# Patient Record
Sex: FEMALE | Race: WHITE | NOT HISPANIC OR LATINO | Employment: UNEMPLOYED | URBAN - METROPOLITAN AREA
[De-identification: names, ages, dates, MRNs, and addresses within clinical notes are randomized per-mention and may not be internally consistent; named-entity substitution may affect disease eponyms.]

---

## 2018-09-13 ENCOUNTER — ANNUAL EXAM (OUTPATIENT)
Dept: OBGYN CLINIC | Facility: CLINIC | Age: 48
End: 2018-09-13
Payer: COMMERCIAL

## 2018-09-13 VITALS — DIASTOLIC BLOOD PRESSURE: 80 MMHG | HEIGHT: 60 IN | SYSTOLIC BLOOD PRESSURE: 112 MMHG

## 2018-09-13 DIAGNOSIS — Z12.39 BREAST CANCER SCREENING: ICD-10-CM

## 2018-09-13 DIAGNOSIS — Z01.419 ENCOUNTER FOR ANNUAL ROUTINE GYNECOLOGICAL EXAMINATION: Primary | ICD-10-CM

## 2018-09-13 PROCEDURE — G0145 SCR C/V CYTO,THINLAYER,RESCR: HCPCS | Performed by: OBSTETRICS & GYNECOLOGY

## 2018-09-13 PROCEDURE — 99386 PREV VISIT NEW AGE 40-64: CPT | Performed by: OBSTETRICS & GYNECOLOGY

## 2018-09-13 PROCEDURE — 87624 HPV HI-RISK TYP POOLED RSLT: CPT | Performed by: OBSTETRICS & GYNECOLOGY

## 2018-09-13 NOTE — PROGRESS NOTES
Assessment/Plan:    Pap smear done as well as annual   Encouraged self-breast examination as well as calcium supplementation  Recommend baseline mammogram   Offered screening lab including cholesterol, thyroid, diabetes  She would like to discuss this with her primary care physician  Reviewed tere menopausal symptoms  Return to office in 1 year  No problem-specific Assessment & Plan notes found for this encounter  Diagnoses and all orders for this visit:    Encounter for annual routine gynecological examination  -     Liquid-based pap, screening    Breast cancer screening  -     Mammo screening bilateral w 3d & cad; Future          Subjective:      Patient ID: Ariana Fritz is a 51 y o  female  HPI     This is a 50-year-old female  ( x4, aged 20, 22, 15, 12) presents as a new patient for annual well gyn exam   Her last gyn exam was approximately 15 years ago  Her menstrual cycles are regular every 4 weeks lasting 3 days with no breakthrough bleeding  She denies any changes in bowel or bladder function  She has been in a monogamous relationship for over 23 years with her   Her last Pap smear was 15 years ago  She states all her Paps have been normal  She has never had a baseline mammogram   She has not seen a physician since her last delivery  She states her pregnancies were low risk and tested negative for gestational diabetes  The following portions of the patient's history were reviewed and updated as appropriate: allergies, current medications, past family history, past medical history, past social history, past surgical history and problem list     Review of Systems   Constitutional: Negative for fatigue, fever and unexpected weight change  Respiratory: Negative for cough, chest tightness, shortness of breath and wheezing  Cardiovascular: Negative  Negative for chest pain and palpitations  Gastrointestinal: Negative    Negative for abdominal distention, abdominal pain, blood in stool, constipation, diarrhea, nausea and vomiting  Genitourinary: Negative  Negative for difficulty urinating, dyspareunia, dysuria, flank pain, frequency, genital sores, hematuria, pelvic pain, urgency, vaginal bleeding, vaginal discharge and vaginal pain  Skin: Negative for rash  Objective:      /80   Ht 5' (1 524 m)   LMP 08/07/2018 (Approximate)   Breastfeeding? No          Physical Exam   Constitutional: She appears well-developed and well-nourished  Cardiovascular: Normal rate and regular rhythm  Pulmonary/Chest: Effort normal and breath sounds normal  Right breast exhibits no inverted nipple, no mass, no nipple discharge, no skin change and no tenderness  Left breast exhibits no inverted nipple, no mass, no nipple discharge, no skin change and no tenderness  Abdominal: Soft  Bowel sounds are normal  She exhibits no distension  There is no tenderness  There is no rebound and no guarding  Genitourinary: Vagina normal and uterus normal  There is no lesion on the right labia  There is no lesion on the left labia  Cervix exhibits no discharge and no friability  Right adnexum displays no mass, no tenderness and no fullness  Left adnexum displays no mass, no tenderness and no fullness  No vaginal discharge found

## 2018-09-14 LAB
HPV HR 12 DNA CVX QL NAA+PROBE: NEGATIVE
HPV16 DNA CVX QL NAA+PROBE: NEGATIVE
HPV18 DNA CVX QL NAA+PROBE: NEGATIVE

## 2018-09-18 LAB
LAB AP GYN PRIMARY INTERPRETATION: NORMAL
LAB AP LMP: NORMAL
Lab: NORMAL

## 2018-09-19 ENCOUNTER — HOSPITAL ENCOUNTER (OUTPATIENT)
Dept: MAMMOGRAPHY | Facility: HOSPITAL | Age: 48
Discharge: HOME | End: 2018-09-19
Payer: COMMERCIAL

## 2018-09-19 DIAGNOSIS — Z12.39 BREAST CANCER SCREENING: ICD-10-CM

## 2018-09-19 PROCEDURE — 77063 BREAST TOMOSYNTHESIS BI: CPT

## 2018-09-19 PROCEDURE — 77067 SCR MAMMO BI INCL CAD: CPT

## 2018-09-21 ENCOUNTER — TELEPHONE (OUTPATIENT)
Dept: OBGYN CLINIC | Facility: CLINIC | Age: 48
End: 2018-09-21

## 2018-09-21 NOTE — TELEPHONE ENCOUNTER
Pt informed  mgram results 9/19/18 - (L) breast asymmetric density - rec diag (L) breast mgram - rad order already in Epic  Pt will schedule appt time

## 2018-09-21 NOTE — TELEPHONE ENCOUNTER
----- Message from Royce Reilly DO sent at 9/19/2018  3:35 PM EDT -----  Inform patient needs additional views

## 2018-10-02 ENCOUNTER — HOSPITAL ENCOUNTER (OUTPATIENT)
Dept: MAMMOGRAPHY | Facility: CLINIC | Age: 48
Discharge: HOME | End: 2018-10-02
Payer: COMMERCIAL

## 2018-10-02 ENCOUNTER — HOSPITAL ENCOUNTER (OUTPATIENT)
Dept: ULTRASOUND IMAGING | Facility: CLINIC | Age: 48
Discharge: HOME | End: 2018-10-02
Payer: COMMERCIAL

## 2018-10-02 DIAGNOSIS — R92.8 ABNORMAL MAMMOGRAM: ICD-10-CM

## 2018-10-02 PROCEDURE — 76642 ULTRASOUND BREAST LIMITED: CPT

## 2018-10-02 PROCEDURE — 77065 DX MAMMO INCL CAD UNI: CPT

## 2018-10-02 PROCEDURE — G0279 TOMOSYNTHESIS, MAMMO: HCPCS

## 2018-10-03 ENCOUNTER — TRANSCRIBE ORDERS (OUTPATIENT)
Dept: ADMINISTRATIVE | Facility: HOSPITAL | Age: 48
End: 2018-10-03

## 2018-10-03 DIAGNOSIS — R92.8 ABNORMAL MAMMOGRAM: Primary | ICD-10-CM

## 2019-04-03 ENCOUNTER — HOSPITAL ENCOUNTER (OUTPATIENT)
Dept: ULTRASOUND IMAGING | Facility: CLINIC | Age: 49
Discharge: HOME | End: 2019-04-03
Payer: COMMERCIAL

## 2019-04-03 ENCOUNTER — HOSPITAL ENCOUNTER (OUTPATIENT)
Dept: MAMMOGRAPHY | Facility: CLINIC | Age: 49
Discharge: HOME | End: 2019-04-03
Payer: COMMERCIAL

## 2019-04-03 VITALS — HEIGHT: 61 IN

## 2019-04-03 DIAGNOSIS — R92.8 ABNORMAL MAMMOGRAM: ICD-10-CM

## 2019-04-03 DIAGNOSIS — R92.8 ABNORMAL MAMMOGRAM: Primary | ICD-10-CM

## 2019-04-03 PROCEDURE — 76642 ULTRASOUND BREAST LIMITED: CPT

## 2019-04-03 PROCEDURE — G0279 TOMOSYNTHESIS, MAMMO: HCPCS

## 2019-04-03 PROCEDURE — 77065 DX MAMMO INCL CAD UNI: CPT

## 2019-04-10 ENCOUNTER — TELEPHONE (OUTPATIENT)
Dept: OBGYN CLINIC | Facility: CLINIC | Age: 49
End: 2019-04-10

## 2019-04-10 DIAGNOSIS — Z12.39 SCREENING FOR BREAST CANCER: Primary | ICD-10-CM

## 2019-12-02 ENCOUNTER — TELEPHONE (OUTPATIENT)
Dept: BEHAVIORAL/MENTAL HEALTH CLINIC | Facility: CLINIC | Age: 49
End: 2019-12-02

## 2020-12-06 ENCOUNTER — AMB VIDEO VISIT (OUTPATIENT)
Dept: OTHER | Facility: HOSPITAL | Age: 50
End: 2020-12-06
Payer: COMMERCIAL

## 2020-12-06 PROCEDURE — 99201 PR OFFICE OUTPATIENT NEW 10 MINUTES: CPT | Performed by: FAMILY MEDICINE

## 2021-02-27 ENCOUNTER — IMMUNIZATIONS (OUTPATIENT)
Dept: FAMILY MEDICINE CLINIC | Facility: HOSPITAL | Age: 51
End: 2021-02-27

## 2021-02-27 DIAGNOSIS — Z23 ENCOUNTER FOR IMMUNIZATION: Primary | ICD-10-CM

## 2021-02-27 PROCEDURE — 0001A SARS-COV-2 / COVID-19 MRNA VACCINE (PFIZER-BIONTECH) 30 MCG: CPT

## 2021-02-27 PROCEDURE — 91300 SARS-COV-2 / COVID-19 MRNA VACCINE (PFIZER-BIONTECH) 30 MCG: CPT

## 2021-03-20 ENCOUNTER — IMMUNIZATIONS (OUTPATIENT)
Dept: FAMILY MEDICINE CLINIC | Facility: HOSPITAL | Age: 51
End: 2021-03-20

## 2021-03-20 DIAGNOSIS — Z23 ENCOUNTER FOR IMMUNIZATION: Primary | ICD-10-CM

## 2021-03-20 PROCEDURE — 0002A SARS-COV-2 / COVID-19 MRNA VACCINE (PFIZER-BIONTECH) 30 MCG: CPT

## 2021-03-20 PROCEDURE — 91300 SARS-COV-2 / COVID-19 MRNA VACCINE (PFIZER-BIONTECH) 30 MCG: CPT

## 2021-12-06 ENCOUNTER — IMMUNIZATIONS (OUTPATIENT)
Dept: FAMILY MEDICINE CLINIC | Facility: HOSPITAL | Age: 51
End: 2021-12-06

## 2021-12-06 DIAGNOSIS — Z23 ENCOUNTER FOR IMMUNIZATION: Primary | ICD-10-CM

## 2021-12-06 PROCEDURE — 91300 COVID-19 PFIZER VACC 0.3 ML: CPT

## 2021-12-06 PROCEDURE — 0001A COVID-19 PFIZER VACC 0.3 ML: CPT

## 2022-06-01 ENCOUNTER — TRANSCRIBE ORDERS (OUTPATIENT)
Dept: PAIN MEDICINE | Facility: CLINIC | Age: 52
End: 2022-06-01

## 2022-06-01 ENCOUNTER — OFFICE VISIT (OUTPATIENT)
Dept: FAMILY MEDICINE CLINIC | Facility: CLINIC | Age: 52
End: 2022-06-01
Payer: COMMERCIAL

## 2022-06-01 VITALS
HEIGHT: 62 IN | OXYGEN SATURATION: 98 % | HEART RATE: 81 BPM | TEMPERATURE: 97.5 F | SYSTOLIC BLOOD PRESSURE: 124 MMHG | RESPIRATION RATE: 18 BRPM | DIASTOLIC BLOOD PRESSURE: 82 MMHG

## 2022-06-01 DIAGNOSIS — Z11.59 NEED FOR HEPATITIS C SCREENING TEST: ICD-10-CM

## 2022-06-01 DIAGNOSIS — Z00.00 ANNUAL PHYSICAL EXAM: Primary | ICD-10-CM

## 2022-06-01 DIAGNOSIS — Z23 ENCOUNTER FOR VACCINATION: ICD-10-CM

## 2022-06-01 DIAGNOSIS — Z12.11 SCREEN FOR COLON CANCER: ICD-10-CM

## 2022-06-01 DIAGNOSIS — E66.3 OVERWEIGHT: ICD-10-CM

## 2022-06-01 DIAGNOSIS — Z12.31 ENCOUNTER FOR SCREENING MAMMOGRAM FOR BREAST CANCER: ICD-10-CM

## 2022-06-01 DIAGNOSIS — Z11.4 SCREENING FOR HIV (HUMAN IMMUNODEFICIENCY VIRUS): ICD-10-CM

## 2022-06-01 PROCEDURE — 90750 HZV VACC RECOMBINANT IM: CPT

## 2022-06-01 PROCEDURE — 99386 PREV VISIT NEW AGE 40-64: CPT | Performed by: FAMILY MEDICINE

## 2022-06-01 PROCEDURE — 90471 IMMUNIZATION ADMIN: CPT

## 2022-06-01 NOTE — PROGRESS NOTES
Assessment/Plan:       Problem List Items Addressed This Visit    None     Visit Diagnoses     Annual physical exam    -  Primary- screening labs as noted  She is doing well w/ diet/exercise  Refer for colonoscopy  Mammo ordered  F/u in one year or prn    Relevant Orders    Comprehensive metabolic panel    Lipid Panel with Direct LDL reflex    Hemoglobin A1C    Need for hepatitis C screening test        Relevant Orders    Hepatitis C Antibody ([facility])    Screening for HIV (human immunodeficiency virus)        Relevant Orders    HIV 1/2 Antigen/Antibody (4th Generation) w Reflex [facility]    Encounter for screening mammogram for breast cancer        Relevant Orders    Mammo screening bilateral w 3d & cad    Screen for colon cancer        Relevant Orders    Ambulatory referral for Colonoscopy    Overweight        Relevant Orders    TSH, 3rd generation with Free T4 reflex    Encounter for vaccination    - shingrix today, return in 2-6 months for repeat dose  She will think about Tdap    Relevant Orders    Zoster Vaccine Recombinant IM (Completed)               Subjective: Ethel Morales is a 45 y o  female here today with chief complaint below:  Chief Complaint   Patient presents with    Physical Exam    OTHER     HM- Declined Tdap ok for hepc, HIV CRC and Mammo      - CC above per clinical staff and reviewed  HPI:  Pt here for first visit in our office to establish care/annual PE  Hasn't had routine care in a while aside from gyn  She has four kids ages 22-23- all in good health   is an orthopedic surgeon in the network  Family hx of colon cancer in grandfather- age 76s  Normal BMs, no blood in stool  Does have occ heartburn, usually triggered by fatty foods or wine  She exercises regularly, works with a   Diet is overall healthy    She has stayed about the same weight and size since she was a teenager, but frustrated that she can't seem to lose weight despite healthy lifestyle habits  Sleep is overall good  Mood good  The following portions of the patient's history were reviewed and updated as appropriate: allergies, current medications, past family history, past medical history, past social history, past surgical history and problem list     ROS:  Review of Systems     Objective:      /82   Pulse 81   Temp 97 5 °F (36 4 °C)   Resp 18   Ht 5' 1 5" (1 562 m)   SpO2 98%   BP Readings from Last 3 Encounters:   06/01/22 124/82   09/13/18 112/80     Wt Readings from Last 3 Encounters:   No data found for Wt               Physical Exam:   Physical Exam  Vitals and nursing note reviewed  Constitutional:       General: She is not in acute distress  Appearance: Normal appearance  She is well-developed  She is not ill-appearing  HENT:      Head: Normocephalic and atraumatic  Eyes:      Conjunctiva/sclera: Conjunctivae normal    Neck:      Vascular: No carotid bruit  Cardiovascular:      Rate and Rhythm: Normal rate and regular rhythm  Heart sounds: Normal heart sounds  No murmur heard  Pulmonary:      Effort: Pulmonary effort is normal  No respiratory distress  Breath sounds: Normal breath sounds  No wheezing  Abdominal:      Palpations: Abdomen is soft  Tenderness: There is no abdominal tenderness  There is no guarding or rebound  Musculoskeletal:      Cervical back: Neck supple  Right lower leg: No edema  Left lower leg: No edema  Lymphadenopathy:      Cervical: No cervical adenopathy  Skin:     General: Skin is warm and dry  Neurological:      Mental Status: She is alert and oriented to person, place, and time  Psychiatric:         Mood and Affect: Mood normal          Behavior: Behavior normal          BMI Counseling: BMI was not able to be calculated due to patient refusing height and/or weight

## 2022-08-04 ENCOUNTER — CLINICAL SUPPORT (OUTPATIENT)
Dept: FAMILY MEDICINE CLINIC | Facility: CLINIC | Age: 52
End: 2022-08-04
Payer: COMMERCIAL

## 2022-08-04 DIAGNOSIS — Z23 ENCOUNTER FOR ADMINISTRATION OF VACCINE: Primary | ICD-10-CM

## 2022-08-04 PROCEDURE — 90471 IMMUNIZATION ADMIN: CPT

## 2022-08-04 PROCEDURE — 90750 HZV VACC RECOMBINANT IM: CPT

## 2022-09-08 ENCOUNTER — HOSPITAL ENCOUNTER (OUTPATIENT)
Dept: MAMMOGRAPHY | Facility: HOSPITAL | Age: 52
Discharge: HOME | End: 2022-09-08
Payer: COMMERCIAL

## 2022-09-08 VITALS — HEIGHT: 62 IN

## 2022-09-08 DIAGNOSIS — Z12.31 ENCOUNTER FOR SCREENING MAMMOGRAM FOR BREAST CANCER: ICD-10-CM

## 2022-09-08 PROCEDURE — 77063 BREAST TOMOSYNTHESIS BI: CPT

## 2022-09-08 PROCEDURE — 77067 SCR MAMMO BI INCL CAD: CPT

## 2022-09-22 ENCOUNTER — PREP FOR PROCEDURE (OUTPATIENT)
Dept: GASTROENTEROLOGY | Facility: CLINIC | Age: 52
End: 2022-09-22

## 2022-09-22 DIAGNOSIS — Z12.11 SPECIAL SCREENING FOR MALIGNANT NEOPLASMS, COLON: Primary | ICD-10-CM

## 2022-09-22 NOTE — TELEPHONE ENCOUNTER
Scheduled date of colonoscopy (as of today): 11/10/22  Physician performing colonoscopy: Dr Selvin Toure   Location of colonoscopy: [facility]  Bowel prep reviewed with patient: Gypsyannie   Instructions reviewed with patient by: Amparo Quigley mailed prep instructions   Clearances: N/A     09/14/22  Screened by: Michaelle Garcia    Referring Provider Dr Aiden Garrido: There is no height or weight on file to calculate BMI  Has patient been referred for a routine screening Colonoscopy? yes  Is the patient between 39-70 years old? yes      Previous Colonoscopy no   If yes:    Date:     Facility:     Reason:       SCHEDULING STAFF: If the patient is between 45yrs-49yrs, please advise patient to confirm benefits/coverage with their insurance company for a routine screening colonoscopy, some insurance carriers will only cover at [facility] or older  If the patient is over 66years old, please schedule an office visit  Does the patient want to see a Gastroenterologist prior to their procedure OR are they having any GI symptoms? no    Has the patient been hospitalized or had abdominal surgery in the past 6 months? no    Does the patient use supplemental oxygen? no    Does the patient take Coumadin, Lovenox, Plavix, Elliquis, Xarelto, or other blood thinning medication? no    Has the patient had a stroke, cardiac event, or stent placed in the past year? no    SCHEDULING STAFF: If patient answers NO to above questions, then schedule procedure  If patient answers YES to above questions, then schedule office appointment  Pt passed OA and can be reached at [phone]    If patient is between 45yrs - 49yrs, please advise patient that we will have to confirm benefits & coverage with their insurance company for a routine screening colonoscopy

## 2022-11-10 ENCOUNTER — ANESTHESIA EVENT (OUTPATIENT)
Dept: GASTROENTEROLOGY | Facility: AMBULARY SURGERY CENTER | Age: 52
End: 2022-11-10

## 2022-11-10 ENCOUNTER — ANESTHESIA (OUTPATIENT)
Dept: GASTROENTEROLOGY | Facility: AMBULARY SURGERY CENTER | Age: 52
End: 2022-11-10

## 2022-11-10 ENCOUNTER — HOSPITAL ENCOUNTER (OUTPATIENT)
Dept: GASTROENTEROLOGY | Facility: AMBULARY SURGERY CENTER | Age: 52
Setting detail: OUTPATIENT SURGERY
End: 2022-11-10
Attending: INTERNAL MEDICINE

## 2022-11-10 VITALS
HEART RATE: 56 BPM | OXYGEN SATURATION: 98 % | SYSTOLIC BLOOD PRESSURE: 136 MMHG | RESPIRATION RATE: 20 BRPM | WEIGHT: 210 LBS | BODY MASS INDEX: 39.65 KG/M2 | DIASTOLIC BLOOD PRESSURE: 63 MMHG | TEMPERATURE: 97.5 F | HEIGHT: 61 IN

## 2022-11-10 DIAGNOSIS — Z12.11 SPECIAL SCREENING FOR MALIGNANT NEOPLASMS, COLON: ICD-10-CM

## 2022-11-10 RX ORDER — LIDOCAINE HYDROCHLORIDE 10 MG/ML
INJECTION, SOLUTION EPIDURAL; INFILTRATION; INTRACAUDAL; PERINEURAL AS NEEDED
Status: DISCONTINUED | OUTPATIENT
Start: 2022-11-10 | End: 2022-11-10

## 2022-11-10 RX ORDER — METOCLOPRAMIDE HYDROCHLORIDE 5 MG/ML
INJECTION INTRAMUSCULAR; INTRAVENOUS AS NEEDED
Status: DISCONTINUED | OUTPATIENT
Start: 2022-11-10 | End: 2022-11-10

## 2022-11-10 RX ORDER — ONDANSETRON 2 MG/ML
INJECTION INTRAMUSCULAR; INTRAVENOUS AS NEEDED
Status: DISCONTINUED | OUTPATIENT
Start: 2022-11-10 | End: 2022-11-10

## 2022-11-10 RX ORDER — PROPOFOL 10 MG/ML
INJECTION, EMULSION INTRAVENOUS AS NEEDED
Status: DISCONTINUED | OUTPATIENT
Start: 2022-11-10 | End: 2022-11-10

## 2022-11-10 RX ORDER — SODIUM CHLORIDE, SODIUM LACTATE, POTASSIUM CHLORIDE, CALCIUM CHLORIDE 600; 310; 30; 20 MG/100ML; MG/100ML; MG/100ML; MG/100ML
INJECTION, SOLUTION INTRAVENOUS CONTINUOUS PRN
Status: DISCONTINUED | OUTPATIENT
Start: 2022-11-10 | End: 2022-11-10

## 2022-11-10 RX ADMIN — METOCLOPRAMIDE 10 MG: 5 INJECTION, SOLUTION INTRAMUSCULAR; INTRAVENOUS at 08:08

## 2022-11-10 RX ADMIN — PROPOFOL 50 MG: 10 INJECTION, EMULSION INTRAVENOUS at 07:46

## 2022-11-10 RX ADMIN — ONDANSETRON 4 MG: 2 INJECTION INTRAMUSCULAR; INTRAVENOUS at 08:08

## 2022-11-10 RX ADMIN — PROPOFOL 50 MG: 10 INJECTION, EMULSION INTRAVENOUS at 07:49

## 2022-11-10 RX ADMIN — Medication 40 MG: at 07:43

## 2022-11-10 RX ADMIN — LIDOCAINE HYDROCHLORIDE 50 MG: 10 INJECTION, SOLUTION EPIDURAL; INFILTRATION; INTRACAUDAL at 07:36

## 2022-11-10 RX ADMIN — PROPOFOL 80 MG: 10 INJECTION, EMULSION INTRAVENOUS at 07:36

## 2022-11-10 RX ADMIN — PROPOFOL 50 MG: 10 INJECTION, EMULSION INTRAVENOUS at 07:39

## 2022-11-10 RX ADMIN — PROPOFOL 50 MG: 10 INJECTION, EMULSION INTRAVENOUS at 07:52

## 2022-11-10 RX ADMIN — PROPOFOL 50 MG: 10 INJECTION, EMULSION INTRAVENOUS at 07:54

## 2022-11-10 RX ADMIN — PROPOFOL 20 MG: 10 INJECTION, EMULSION INTRAVENOUS at 07:37

## 2022-11-10 RX ADMIN — PROPOFOL 50 MG: 10 INJECTION, EMULSION INTRAVENOUS at 07:41

## 2022-11-10 RX ADMIN — PROPOFOL 50 MG: 10 INJECTION, EMULSION INTRAVENOUS at 07:44

## 2022-11-10 RX ADMIN — SODIUM CHLORIDE, SODIUM LACTATE, POTASSIUM CHLORIDE, AND CALCIUM CHLORIDE: .6; .31; .03; .02 INJECTION, SOLUTION INTRAVENOUS at 07:20

## 2022-11-10 RX ADMIN — PROPOFOL 50 MG: 10 INJECTION, EMULSION INTRAVENOUS at 07:42

## 2022-11-10 NOTE — PERIOPERATIVE NURSING NOTE
Patient awoke with 7/10 pain in the RUQ  She stated, "I want to take a deep breath but I can't"  Notified anesthesia Dr Veronica Reilly and Dr Jolie Manuel  Assisted and instructed patient to reposition in the bed and amb in the liao  Patient reported less pain (4/10)after returning to stretcher lying supine  VSS  Spouse at bedside

## 2022-11-10 NOTE — ANESTHESIA PREPROCEDURE EVALUATION
Procedure:  COLONOSCOPY    Relevant Problems   No relevant active problems        Physical Exam    Airway    Mallampati score: I  TM Distance: >3 FB  Neck ROM: full     Dental       Cardiovascular      Pulmonary      Other Findings        Anesthesia Plan  ASA Score- 2     Anesthesia Type- IV sedation with anesthesia with ASA Monitors  Additional Monitors:   Airway Plan:           Plan Factors-Exercise tolerance (METS): >4 METS  Chart reviewed  Existing labs reviewed  Patient summary reviewed  Induction- intravenous  Postoperative Plan-     Informed Consent- Anesthetic plan and risks discussed with patient  I personally reviewed this patient with the CRNA  Discussed and agreed on the Anesthesia Plan with the CRNA  Valente Diego

## 2022-11-10 NOTE — H&P
History and Physical -  [facility]  Johnson Mendez 47 y o  female MRN: [id number]                  HPI: Johnson Mendez is a 47y o  year old female who presents for colonoscopy  REVIEW OF SYSTEMS: Per the HPI, and otherwise unremarkable  Historical Information   History reviewed  No pertinent past medical history  Past Surgical History:   Procedure Laterality Date   • NO PAST SURGERIES       Social History   Social History     Substance and Sexual Activity   Alcohol Use Yes    Comment: Ocassionally     Social History     Substance and Sexual Activity   Drug Use No     Social History     Tobacco Use   Smoking Status Never Smoker   Smokeless Tobacco Never Used     Family History   Problem Relation Age of Onset   • Alzheimer's disease Mother         dx age 46,  age 58  youngest of six sibs- no others with dementia   • Hypertension Mother    • Colon polyps Father    • Prostate cancer Father 68   • No Known Problems Sister    • No Known Problems Daughter    • Colon cancer Paternal Grandmother 76         age 76 of colon ca   • Other Brother         unknown cause   • No Known Problems Son    • No Known Problems Son    • No Known Problems Son    • Breast cancer Maternal Aunt 68       Meds/Allergies     No current outpatient medications on file  No current facility-administered medications for this encounter      Facility-Administered Medications Ordered in Other Encounters:   •  lactated ringers infusion, , Intravenous, Continuous PRN, New Bag at 11/10/22 0720    Allergies   Allergen Reactions   • Augmentin [Amoxicillin-Pot Clavulanate] Hives       Objective     /63   Pulse 67   Temp 97 7 °F (36 5 °C) (Temporal)   Resp 18   Ht 5' 1" (1 549 m)   Wt 95 3 kg (210 lb)   LMP 2019 (Exact Date)   SpO2 100%   BMI 39 68 kg/m²       PHYSICAL EXAM    Gen: NAD  Head: NCAT  CV: RRR  CHEST: Clear  ABD: soft, NT/ND  EXT: no edema      ASSESSMENT/PLAN:  This is a 47y o  year old female here for colonoscopy, and she is stable and optimized for her procedure

## 2022-11-10 NOTE — ANESTHESIA POSTPROCEDURE EVALUATION
Post-Op Assessment Note    CV Status:  Stable  Pain Score: 0    Pain management: adequate     Mental Status:  Alert and awake   Hydration Status:  Euvolemic   PONV Controlled:  Controlled   Airway Patency:  Patent      Post Op Vitals Reviewed: Yes      Staff: CRNA         No complications documented      BP   128/64   Temp      Pulse  73   Resp   17   SpO2   98

## 2023-02-16 ENCOUNTER — APPOINTMENT (OUTPATIENT)
Dept: LAB | Facility: CLINIC | Age: 53
End: 2023-02-16

## 2023-02-16 DIAGNOSIS — Z11.59 NEED FOR HEPATITIS C SCREENING TEST: ICD-10-CM

## 2023-02-16 DIAGNOSIS — E66.3 OVERWEIGHT: ICD-10-CM

## 2023-02-16 DIAGNOSIS — Z00.00 ANNUAL PHYSICAL EXAM: ICD-10-CM

## 2023-02-16 DIAGNOSIS — Z11.4 SCREENING FOR HIV (HUMAN IMMUNODEFICIENCY VIRUS): ICD-10-CM

## 2023-02-16 LAB
ALBUMIN SERPL BCP-MCNC: 3.7 G/DL (ref 3.5–5)
ALP SERPL-CCNC: 67 U/L (ref 46–116)
ALT SERPL W P-5'-P-CCNC: 27 U/L (ref 12–78)
ANION GAP SERPL CALCULATED.3IONS-SCNC: 9 MMOL/L (ref 4–13)
AST SERPL W P-5'-P-CCNC: 20 U/L (ref 5–45)
BILIRUB SERPL-MCNC: 0.35 MG/DL (ref 0.2–1)
BUN SERPL-MCNC: 23 MG/DL (ref 5–25)
CALCIUM SERPL-MCNC: 10.1 MG/DL (ref 8.3–10.1)
CHLORIDE SERPL-SCNC: 106 MMOL/L (ref 96–108)
CHOLEST SERPL-MCNC: 221 MG/DL
CO2 SERPL-SCNC: 24 MMOL/L (ref 21–32)
CREAT SERPL-MCNC: 0.69 MG/DL (ref 0.6–1.3)
EST. AVERAGE GLUCOSE BLD GHB EST-MCNC: 111 MG/DL
GFR SERPL CREATININE-BSD FRML MDRD: 99 ML/MIN/1.73SQ M
GLUCOSE P FAST SERPL-MCNC: 87 MG/DL (ref 65–99)
HBA1C MFR BLD: 5.5 %
HCV AB SER QL: NORMAL
HDLC SERPL-MCNC: 53 MG/DL
LDLC SERPL CALC-MCNC: 150 MG/DL (ref 0–100)
POTASSIUM SERPL-SCNC: 4.2 MMOL/L (ref 3.5–5.3)
PROT SERPL-MCNC: 8.1 G/DL (ref 6.4–8.4)
SODIUM SERPL-SCNC: 139 MMOL/L (ref 135–147)
TRIGL SERPL-MCNC: 89 MG/DL
TSH SERPL DL<=0.05 MIU/L-ACNC: 1.35 UIU/ML (ref 0.45–4.5)

## 2023-02-17 LAB
HIV 1+2 AB+HIV1 P24 AG SERPL QL IA: NORMAL
HIV 2 AB SERPL QL IA: NORMAL
HIV1 AB SERPL QL IA: NORMAL
HIV1 P24 AG SERPL QL IA: NORMAL

## 2023-05-01 ENCOUNTER — OFFICE VISIT (OUTPATIENT)
Dept: FAMILY MEDICINE CLINIC | Facility: CLINIC | Age: 53
End: 2023-05-01

## 2023-05-01 ENCOUNTER — TELEPHONE (OUTPATIENT)
Dept: FAMILY MEDICINE CLINIC | Facility: CLINIC | Age: 53
End: 2023-05-01

## 2023-05-01 VITALS
HEART RATE: 65 BPM | BODY MASS INDEX: 39.04 KG/M2 | SYSTOLIC BLOOD PRESSURE: 118 MMHG | RESPIRATION RATE: 18 BRPM | DIASTOLIC BLOOD PRESSURE: 70 MMHG | TEMPERATURE: 97.3 F | HEIGHT: 61 IN | OXYGEN SATURATION: 99 % | WEIGHT: 206.8 LBS

## 2023-05-01 DIAGNOSIS — E66.01 CLASS 2 SEVERE OBESITY WITH SERIOUS COMORBIDITY AND BODY MASS INDEX (BMI) OF 39.0 TO 39.9 IN ADULT, UNSPECIFIED OBESITY TYPE (HCC): Primary | ICD-10-CM

## 2023-05-01 DIAGNOSIS — E66.01 CLASS 2 SEVERE OBESITY WITH SERIOUS COMORBIDITY AND BODY MASS INDEX (BMI) OF 39.0 TO 39.9 IN ADULT, UNSPECIFIED OBESITY TYPE (HCC): ICD-10-CM

## 2023-05-01 DIAGNOSIS — E78.49 OTHER HYPERLIPIDEMIA: Primary | ICD-10-CM

## 2023-05-01 DIAGNOSIS — E78.49 OTHER HYPERLIPIDEMIA: ICD-10-CM

## 2023-05-01 PROBLEM — E66.812 CLASS 2 SEVERE OBESITY WITH SERIOUS COMORBIDITY AND BODY MASS INDEX (BMI) OF 39.0 TO 39.9 IN ADULT (HCC): Status: ACTIVE | Noted: 2023-05-01

## 2023-05-01 NOTE — ASSESSMENT & PLAN NOTE
Improved  Recommend lifestyle modifications  Weight management referral given today for medical weight loss options, though patient declines  I encouraged her to consider weight management referral as when people are frustrated despite trying to reach their weight loss goals and are not successful, a change in plan and further guidance may be very helpful  Call Insurance to Ask About Med Coverage for Obesity, Hyperlipidemia -     1    GLP-1 Agonists - Injectable - Wegovy, Saxenda, Mounjaro, Ozempic, Victoza, Byetta, etc

## 2023-05-01 NOTE — PATIENT INSTRUCTIONS
Call Insurance to Ask About Med Coverage for Obesity, Hyperlipidemia -      GLP-1 Agonists - Injectable - Wegovy, Saxenda, Mounjaro, Ozempic, Victoza, Byetta, etc

## 2023-05-01 NOTE — TELEPHONE ENCOUNTER
Patient called, these medications are covered under her insurance plan, the Marty Nelson and Ryland Bolanos are covered meds with a prior authorization, the authorization should be faxed over to [phone]  The patient would like to go with the weekly injectable instead of the daily so please send that medication over for the prior authorization  The Wyanjelicaa Cici is the cheaper one for her out of pocket but she could not remember if that was the once a week injectable or not

## 2023-05-01 NOTE — TELEPHONE ENCOUNTER
Phone call placed to pt, reviewed message from [address]  Pt verbalized understanding  Will await denial to complete PA form and fax back to number provided by pt

## 2023-05-01 NOTE — PROGRESS NOTES
Assessment/Plan:  Problem List Items Addressed This Visit        Other    Class 2 severe obesity with serious comorbidity and body mass index (BMI) of 39 0 to 39 9 in adult [facility])     Improved  Recommend lifestyle modifications  Weight management referral given today for medical weight loss options, though patient declines  I encouraged her to consider weight management referral as when people are frustrated despite trying to reach their weight loss goals and are not successful, a change in plan and further guidance may be very helpful  Call Insurance to Ask About Med Coverage for Obesity, Hyperlipidemia -      GLP-1 Agonists - Injectable - Wegovy, Saxenda, Mounjaro, Ozempic, Victoza, Byetta, etc              Relevant Orders    Ambulatory Referral to Weight Management    Comprehensive metabolic panel    Other hyperlipidemia - Primary     Stable s statin  Recommend lifestyle modifications  Relevant Orders    Ambulatory Referral to Weight Management    Comprehensive metabolic panel        Return if symptoms worsen or fail to improve  No future appointments  Subjective: Dhara Jauregui is a 47 y o  female who presents today for a follow-up on her chronic medical conditions  HPI:  Chief Complaint   Patient presents with    Medication Management     Pt would like to discuss weight loss medication options  -- Above per clinical staff and reviewed  --      HPI      Today:      Obesity - Watching diet  She is counting her macros  +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, 1 day per week, Home Exercise for 15-20 minutes, 2 times per week  She has done Weight Watchers previously s success  She had benefit with OptiFast 10 years ago, but felt eating the products long-term was challenging due to taste  She has not attended a formal Weight Management program   She has been the same weight since 16yo and wants the weight off now  Hyperlipidemia - No statin        Reviewed:  Labs "2/16/23    The following portions of the patient's history were reviewed and updated as appropriate: allergies, current medications, past family history, past medical history, past social history, past surgical history and problem list       Review of Systems   Constitutional: Negative for appetite change, chills, diaphoresis, fatigue and fever  Respiratory: Negative for chest tightness and shortness of breath  Cardiovascular: Negative for chest pain  Gastrointestinal: Negative for abdominal pain, blood in stool, diarrhea, nausea and vomiting  Genitourinary: Negative for dysuria  No current outpatient medications on file  No current facility-administered medications for this visit  Objective:  /70   Pulse 65   Temp (!) 97 3 °F (36 3 °C)   Resp 18   Ht 5' 1\" (1 549 m)   Wt 93 8 kg (206 lb 12 8 oz)   LMP 04/03/2019 (Exact Date)   SpO2 99%   BMI 39 07 kg/m²    Wt Readings from Last 3 Encounters:   05/01/23 93 8 kg (206 lb 12 8 oz)   11/10/22 95 3 kg (210 lb)      BP Readings from Last 3 Encounters:   05/01/23 118/70   11/10/22 136/63   06/01/22 124/82          Physical Exam  Vitals and nursing note reviewed  Constitutional:       Appearance: Normal appearance  She is well-developed  She is obese  HENT:      Head: Normocephalic and atraumatic  Eyes:      Conjunctiva/sclera: Conjunctivae normal    Neck:      Thyroid: No thyromegaly  Cardiovascular:      Rate and Rhythm: Normal rate and regular rhythm  Pulses: Normal pulses  Heart sounds: Normal heart sounds  Pulmonary:      Effort: Pulmonary effort is normal       Breath sounds: Normal breath sounds  Abdominal:      General: Bowel sounds are normal  There is no distension  Palpations: Abdomen is soft  There is no mass  Tenderness: There is no abdominal tenderness  There is no guarding or rebound  Musculoskeletal:         General: No swelling or tenderness  Cervical back: Neck supple        " Right lower leg: No edema  Left lower leg: No edema  Lymphadenopathy:      Cervical: No cervical adenopathy  Neurological:      General: No focal deficit present  Mental Status: She is alert and oriented to person, place, and time  Psychiatric:         Mood and Affect: Mood normal          Lab Results:      Lab Results   Component Value Date    TRIG 89 02/16/2023    HDL 53 02/16/2023    ALT 27 02/16/2023    AST 20 02/16/2023    K 4 2 02/16/2023     02/16/2023    CREATININE 0 69 02/16/2023    BUN 23 02/16/2023    CO2 24 02/16/2023    GLUF 87 02/16/2023    HGBA1C 5 5 02/16/2023     No results found for: URICACID  Invalid input(s): BASENAME Vitamin D    No results found       POCT Labs

## 2023-05-01 NOTE — TELEPHONE ENCOUNTER
Both Mounjaro (more affordable per patient, but used to treat diabetes; pt is not diabetic) and Wegovy (used to treat obesity) are weekly  eRx Wegovy 0 25mg SQ weekly x 1 month sent  Patient has Rx to check CMP in 1 month  Patient should contact us for refill of next higher dose in 3 weeks  Nurse to schedule F/U HL, Obesity, GLP-1 Rx, Labs in 6 weeks

## 2023-05-11 NOTE — TELEPHONE ENCOUNTER
Prior authorization approved by insurance for [facility] through 05/04/2024

## 2023-06-11 DIAGNOSIS — J02.0 STREP PHARYNGITIS: Primary | ICD-10-CM

## 2023-06-11 RX ORDER — AMOXICILLIN 500 MG/1
500 CAPSULE ORAL EVERY 8 HOURS SCHEDULED
Qty: 30 CAPSULE | Refills: 0 | Status: SHIPPED | OUTPATIENT
Start: 2023-06-11 | End: 2023-06-21

## 2024-03-09 DIAGNOSIS — E66.01 CLASS 2 SEVERE OBESITY WITH SERIOUS COMORBIDITY AND BODY MASS INDEX (BMI) OF 39.0 TO 39.9 IN ADULT, UNSPECIFIED OBESITY TYPE: ICD-10-CM

## 2024-03-09 DIAGNOSIS — E78.49 OTHER HYPERLIPIDEMIA: ICD-10-CM

## 2024-03-11 NOTE — TELEPHONE ENCOUNTER
Pt was started on wegovy 5/15/23 and was dispensed the medication then, but never had any further refills of the medication.  That was nearly a year ago.  Looks like she had a courtesy refill sent in of this medication that she hasn't had in 10 months?    She has an appointment for an annual PE with me later this month- ordered labs to be done prior to the appointment.   We can review the medication when she is here for her physical.  If she would like the 0.25 mg dose sent in again to restart the medication I can send this back in for her, but still needs to keep the appointment for her physical.

## 2025-02-10 ENCOUNTER — APPOINTMENT (OUTPATIENT)
Dept: LAB | Facility: CLINIC | Age: 55
End: 2025-02-10
Payer: COMMERCIAL

## 2025-02-10 DIAGNOSIS — E78.49 OTHER HYPERLIPIDEMIA: ICD-10-CM

## 2025-02-10 DIAGNOSIS — E66.01 CLASS 2 SEVERE OBESITY WITH SERIOUS COMORBIDITY AND BODY MASS INDEX (BMI) OF 39.0 TO 39.9 IN ADULT, UNSPECIFIED OBESITY TYPE (HCC): ICD-10-CM

## 2025-02-10 DIAGNOSIS — E66.812 CLASS 2 SEVERE OBESITY WITH SERIOUS COMORBIDITY AND BODY MASS INDEX (BMI) OF 39.0 TO 39.9 IN ADULT, UNSPECIFIED OBESITY TYPE (HCC): ICD-10-CM

## 2025-02-10 LAB
EST. AVERAGE GLUCOSE BLD GHB EST-MCNC: 120 MG/DL
HBA1C MFR BLD: 5.8 %

## 2025-02-10 PROCEDURE — 80053 COMPREHEN METABOLIC PANEL: CPT

## 2025-02-10 PROCEDURE — 36415 COLL VENOUS BLD VENIPUNCTURE: CPT

## 2025-02-10 PROCEDURE — 83036 HEMOGLOBIN GLYCOSYLATED A1C: CPT

## 2025-02-10 PROCEDURE — 80061 LIPID PANEL: CPT

## 2025-02-11 ENCOUNTER — OFFICE VISIT (OUTPATIENT)
Dept: FAMILY MEDICINE CLINIC | Facility: CLINIC | Age: 55
End: 2025-02-11
Payer: COMMERCIAL

## 2025-02-11 VITALS
SYSTOLIC BLOOD PRESSURE: 146 MMHG | HEART RATE: 65 BPM | TEMPERATURE: 98.2 F | WEIGHT: 208.8 LBS | BODY MASS INDEX: 39.42 KG/M2 | OXYGEN SATURATION: 98 % | DIASTOLIC BLOOD PRESSURE: 78 MMHG | HEIGHT: 61 IN

## 2025-02-11 DIAGNOSIS — E66.813 CLASS 3 SEVERE OBESITY WITH SERIOUS COMORBIDITY AND BODY MASS INDEX (BMI) OF 40.0 TO 44.9 IN ADULT, UNSPECIFIED OBESITY TYPE (HCC): Primary | ICD-10-CM

## 2025-02-11 DIAGNOSIS — E78.2 MIXED HYPERLIPIDEMIA: ICD-10-CM

## 2025-02-11 DIAGNOSIS — Z12.31 ENCOUNTER FOR SCREENING MAMMOGRAM FOR BREAST CANCER: ICD-10-CM

## 2025-02-11 DIAGNOSIS — E66.01 CLASS 3 SEVERE OBESITY WITH SERIOUS COMORBIDITY AND BODY MASS INDEX (BMI) OF 40.0 TO 44.9 IN ADULT, UNSPECIFIED OBESITY TYPE (HCC): Primary | ICD-10-CM

## 2025-02-11 DIAGNOSIS — R73.03 PREDIABETES: ICD-10-CM

## 2025-02-11 LAB
ALBUMIN SERPL BCG-MCNC: 4 G/DL (ref 3.5–5)
ALP SERPL-CCNC: 70 U/L (ref 34–104)
ALT SERPL W P-5'-P-CCNC: 28 U/L (ref 7–52)
ANION GAP SERPL CALCULATED.3IONS-SCNC: 8 MMOL/L (ref 4–13)
AST SERPL W P-5'-P-CCNC: 27 U/L (ref 13–39)
BILIRUB SERPL-MCNC: 0.36 MG/DL (ref 0.2–1)
BUN SERPL-MCNC: 22 MG/DL (ref 5–25)
CALCIUM SERPL-MCNC: 9.3 MG/DL (ref 8.4–10.2)
CHLORIDE SERPL-SCNC: 103 MMOL/L (ref 96–108)
CHOLEST SERPL-MCNC: 234 MG/DL (ref ?–200)
CO2 SERPL-SCNC: 28 MMOL/L (ref 21–32)
CREAT SERPL-MCNC: 0.74 MG/DL (ref 0.6–1.3)
GFR SERPL CREATININE-BSD FRML MDRD: 91 ML/MIN/1.73SQ M
GLUCOSE P FAST SERPL-MCNC: 100 MG/DL (ref 65–99)
HDLC SERPL-MCNC: 54 MG/DL
LDLC SERPL CALC-MCNC: 169 MG/DL (ref 0–100)
POTASSIUM SERPL-SCNC: 4.6 MMOL/L (ref 3.5–5.3)
PROT SERPL-MCNC: 7.3 G/DL (ref 6.4–8.4)
SODIUM SERPL-SCNC: 139 MMOL/L (ref 135–147)
TRIGL SERPL-MCNC: 57 MG/DL (ref ?–150)

## 2025-02-11 PROCEDURE — 99215 OFFICE O/P EST HI 40 MIN: CPT | Performed by: FAMILY MEDICINE

## 2025-02-11 RX ORDER — TIRZEPATIDE 2.5 MG/.5ML
2.5 INJECTION, SOLUTION SUBCUTANEOUS WEEKLY
Qty: 2 ML | Refills: 0 | Status: SHIPPED | OUTPATIENT
Start: 2025-02-11 | End: 2025-03-11

## 2025-02-11 NOTE — PATIENT INSTRUCTIONS
"  Patient Education     Weight loss treatments   The Basics   Written by the doctors and editors at [facility]   How do I know if I am at a healthy weight? -- This depends on your height and your overall health.  Doctors use a special measure called \"body mass index,\" or \"BMI,\" to help understand a person's weight. Your weight and height are used to calculate your BMI (figure 1). Based on this number, you fall into 1 of the following categories:   Underweight - BMI under 18.5   Healthy weight - BMI between 18.5 and 24.9   Overweight - BMI between 25 and 29.9   Having obesity - BMI 30 or greater  Your doctor or nurse will often want to calculate your BMI at your medical appointments. But it's important to remember that your weight and BMI are just 1 piece of your overall health. Someone with a lower BMI might not be healthy overall, and someone with a higher BMI can still be healthy.  How does my weight affect my health? -- Having obesity increases the risks of many different health problems. It can also make it harder for you to move, breathe, and do other things that people who are at a healthy weight can do easily.  People with obesity are more likely to get diabetes, heart disease, cancer, and lots of other health problems. People with obesity also live less time than people of healthy weight. That's why it's important to try to keep your weight in a healthy range.  What should I do if I want to lose weight? -- If you would like to lose weight, you can start by talking to your doctor or nurse. They can help you make a plan to do this in a healthy way. They can also help you understand how many calories your body needs for energy. Losing weight takes work and can be hard, so it helps to have support.  The best weight loss plans help you have a healthy view of eating and exercise. With a good weight loss plan, many people can lose weight and keep it off. Reducing calories in your diet, burning calories through " "exercise, or both can help you lose weight:   Diet - There is no specific diet plan that works for everyone. Many \"trendy\" weight loss programs can end up being more harmful than helpful. Any diet that reduces the number of calories you eat can help you lose weight, as long as you stick with it. You should try to find an eating pattern that works for you.  It can also help to work with a dietitian (food expert). They can help you make healthy changes to your diet while making sure that you get the nutrients your body needs.   Movement - Even gentle forms of exercise are good for your health. You can walk, dance, garden, or even just move your arms while sitting. For weight loss, the important thing is to increase the number of calories you burn by moving more.  Combining a healthy diet with regular physical activity can help you get the best results. This is important even if your doctor recommends treatment for weight loss.  If you change your eating or exercise habits for a short time, you might lose weight. But you will regain the weight if you go back to your old habits. Weight loss is about changing your habits for the long term.  Can medicines help with weight loss? -- Sometimes. Prescription weight loss medicines work by reducing your appetite or by changing the way you digest food. Your doctor might recommend medicine if you have not been able to lose weight in other ways and you:   Have a BMI of 30 or greater, or   Have a BMI between 27 and 29.9 and also have weight-related medical problems, such as diabetes, heart disease, or high blood pressure  Doctors sometimes prescribe \"incretin-based\" medicines to help with weight loss. These were originally used in treating diabetes. But they can also help with weight loss in some people without diabetes. Examples include:   Tirzepatide (sample brand names: Zepbound, Mounjaro)   Semaglutide (sample brand names: Wegovy, Ozempic)   Liraglutide (sample brand name: " "Jyothi)  Currently, these medicines come as shots that go under the skin. They work by increasing the amount of insulin your body releases after eating. (Insulin is a hormone that helps control your blood sugar level.) They also slow digestion and make people feel full more quickly. These medicines can work well for weight loss. But they can also cause side effects, like nausea, vomiting, diarrhea, and constipation. And they can be expensive. Most people have to keep taking these medicines long term. If they stop the medicine, they often gain back the weight they lost.  There are other medicines that can be used for weight loss, too.  Medicines can be a helpful part of treatment for some people. But even if your doctor does prescribe medicine, they will also still recommend making lifestyle changes. This can help improve your overall health.  If you are interested in taking medicine to help you lose weight, talk with your doctor. They can help you understand your options.  Can I try herbal or non-prescription medicines to lose weight? -- Be careful about non-prescription (\"over-the-counter\") products advertised to help with weight loss:   Many herbal weight loss medicines do not work, and some are unsafe. Check with your doctor or pharmacist before you take any herbal weight loss medicines.   There is also an over-the-counter version of a prescription medicine called orlistat (brand name: Stu). It is probably safe to try, but it can cause unwanted side effects, such as cramps, burping, and gas.   Some weight loss medicines are sold over the internet. However, they can contain harmful ingredients and be unsafe.  How does weight loss surgery work? -- It works by making your stomach smaller. Some types of surgery also change the path that food takes through your digestive system. With these types, your body absorbs fewer calories and nutrients from food.  Weight loss surgery is not an option for everyone. If you are " "thinking about surgery, your doctor can talk to you about your situation and options. They can also talk to you about the risks and benefits of surgery.  How do I decide if weight loss treatment is right for me? -- If your doctor suggests weight loss treatment, ask these questions:   About how much weight can I expect to lose, and how long will that take? - This depends on the treatment. Some weight loss medicines start working quickly, often within a few months. For surgery, the amount of weight loss and how long it takes will depend on which procedure you have.   What are the risks of treatment for someone like me? - All weight loss medicines can have side effects. All weight loss surgeries can lead to infection, bleeding, the need for other operations, and even death. To reduce the risk of these problems with surgery, make sure that your surgeon is very experienced and that you are treated at a certified \"[facility] of [facility].\"   What changes will I need to make to my diet and lifestyle? - Weight loss treatments are not \"shortcuts\" that you can take to avoid making lifestyle changes. People must also change how they eat and how active they are. No single weight loss treatment works on its own. Sometimes, people can get surgery only after they lose some weight on their own through diet and exercise first. Working with a dietitian can help.   Will I be able to process food normally? - Some types of weight loss surgeries leave people unable to get all of the nutrients that they need from food. People who have this problem must take vitamin and mineral supplements for the rest of their lives.  All topics are updated as new evidence becomes available and our peer review process is complete.  This topic retrieved from ???? on: Apr 20, 2024.  Topic 42885 Version 25.0  Release: 32.3.2 - C32.109  © 2024 UpToDate, Inc. and/or its affiliates. All rights reserved.  figure 1: Your body mass index (BMI)     Find your " height (in feet and inches) in the top row. Then, find your weight (in pounds) in the first column. Now, find where the column for your height and the row for your weight meet. That is your BMI. For example, if you are 5-feet-9-inches tall and you weigh 260 pounds, your BMI is 38.  Braclet 33245 Version 4.0  Consumer Information Use and Disclaimer   Disclaimer: This generalized information is a limited summary of diagnosis, treatment, and/or medication information. It is not meant to be comprehensive and should be used as a tool to help the user understand and/or assess potential diagnostic and treatment options. It does NOT include all information about conditions, treatments, medications, side effects, or risks that may apply to a specific patient. It is not intended to be medical advice or a substitute for the medical advice, diagnosis, or treatment of a health care provider based on the health care provider's examination and assessment of a patient's specific and unique circumstances. Patients must speak with a health care provider for complete information about their health, medical questions, and treatment options, including any risks or benefits regarding use of medications. This information does not endorse any treatments or medications as safe, effective, or approved for treating a specific patient. UpToDate, Inc. and its affiliates disclaim any warranty or liability relating to this information or the use thereof.The use of this information is governed by the Terms of Use, available at https://www.upadters"[employer]uwer.com/en/know/clinical-effectiveness-terms. 2024© UpToDate, Inc. and its affiliates and/or licensors. All rights reserved.  Copyright   © 2024 UpToDate, Inc. and/or its affiliates. All rights reserved.

## 2025-02-11 NOTE — ASSESSMENT & PLAN NOTE
Should improve with use of zepbound  Orders:  •  CBC and differential; Future  •  Hemoglobin A1C; Future

## 2025-02-11 NOTE — ASSESSMENT & PLAN NOTE
Prior Authorization Clinical Questions for Weight Management Pharmacotherapy    1. Does the patient have a contrainidcation to medication prescribed for weight management?: No  2. Does the patient have a diagnosis of obesity, confirmed by a BMI greater than or equal to 30 kg/m^2?: Yes  3. Does the patient have a BMI of greater than or equal to 27 kg/m^2 with at least one weight-related comorbidity/risk factor/complication (e.g. diabetes, dyslipidemia, coronary artery disease)?: Yes  4. Weight-related co-morbidities/risk factors: prediabetes, dyslipidemia  6. ZEPBOUND JOEY Indication: Does patient have documented JOEY diagnosed via sleep study (insurance will require copy of sleep study results for approval)?: No  7. Has the patient been on a weight loss regimen of low-calorie diet, increased physical activity, and lifestyle modifications for a minimum of 6 months?: Yes  8. Has the patient completed a comprehensive weight loss program (ie, Weight Watchers, Noom, Bariatrics, other liam on phone)? If so, what?: Yes   -- Q8 Further Explanation: Noom, Weight Watchers, Medi-fast   9. Does the patient have a history of type 2 diabetes?: No  10. Has the member tried and failed other weight loss medication within the past 12 months?: No  11. Will the member use requested medication in combination with another GLP agonist or weight loss drug?: No  12. Is the medication a controlled substance?: No     Baseline weight (in pounds): 208 lbs  Current weight (in pounds): 208 lbs  Weight loss percentage: 0%       Weight management:  - Weight stable over past several years with slight increase from 210 lbs in 2022 to 206 lbs in 2023, currently 208 lbs  - Blood glucose levels mildly elevated  - No history of diabetes or sleep apnea  - No recent use of weight loss medications  - GFR normal at 91 mL/min  - Discussed potential benefits and risks of Zepbound and Wegovy    - Mechanisms of action, side effects, and long-term implications  -  Advised to maintain an active lifestyle, continue exercise, and ensure adequate protein intake  - Declines referral to weight management office for further exploration of weight loss medications and nutritional support  - Prescribed Zepbound with instructions for administration and proper disposal of used injectors  - Advised to contact the office after the third dose to discuss dosage increase  - Scheduled for touch base visits and labs every 3 to 4 months  Orders:  •  CBC and differential; Future  •  Comprehensive metabolic panel; Future  •  Hemoglobin A1C; Future  •  Lipid Panel with Direct LDL reflex; Future  •  tirzepatide (Zepbound) 2.5 mg/0.5 mL auto-injector; Inject 0.5 mL (2.5 mg total) under the skin once a week for 28 days

## 2025-02-11 NOTE — ASSESSMENT & PLAN NOTE
Elevated cholesterol:  - Despite weight training and dietary modifications, cholesterol levels remain elevated  - Re-evaluation in 4 months to assess improvements with weight loss  - Consider cholesterol-lowering medication if no significant improvement  Orders:  •  Comprehensive metabolic panel; Future  •  Lipid Panel with Direct LDL reflex; Future

## 2025-02-11 NOTE — PROGRESS NOTES
Assessment & Plan        Assessment & Plan  Class 3 severe obesity with serious comorbidity and body mass index (BMI) of 40.0 to 44.9 in adult, unspecified obesity type (HCC)  Prior Authorization Clinical Questions for Weight Management Pharmacotherapy    1. Does the patient have a contrainidcation to medication prescribed for weight management?: No  2. Does the patient have a diagnosis of obesity, confirmed by a BMI greater than or equal to 30 kg/m^2?: Yes  3. Does the patient have a BMI of greater than or equal to 27 kg/m^2 with at least one weight-related comorbidity/risk factor/complication (e.g. diabetes, dyslipidemia, coronary artery disease)?: Yes  4. Weight-related co-morbidities/risk factors: prediabetes, dyslipidemia  6. ZEPBOUND JOEY Indication: Does patient have documented JOEY diagnosed via sleep study (insurance will require copy of sleep study results for approval)?: No  7. Has the patient been on a weight loss regimen of low-calorie diet, increased physical activity, and lifestyle modifications for a minimum of 6 months?: Yes  8. Has the patient completed a comprehensive weight loss program (ie, Weight Watchers, Noom, Bariatrics, other liam on phone)? If so, what?: Yes   -- Q8 Further Explanation: Noom, Weight Watchers, Medi-fast   9. Does the patient have a history of type 2 diabetes?: No  10. Has the member tried and failed other weight loss medication within the past 12 months?: No  11. Will the member use requested medication in combination with another GLP agonist or weight loss drug?: No  12. Is the medication a controlled substance?: No     Baseline weight (in pounds): 208 lbs  Current weight (in pounds): 208 lbs  Weight loss percentage: 0%       Weight management:  - Weight stable over past several years with slight increase from 210 lbs in 2022 to 206 lbs in 2023, currently 208 lbs  - Blood glucose levels mildly elevated  - No history of diabetes or sleep apnea  - No recent use of weight  loss medications  - GFR normal at 91 mL/min  - Discussed potential benefits and risks of Zepbound and Wegovy    - Mechanisms of action, side effects, and long-term implications  - Advised to maintain an active lifestyle, continue exercise, and ensure adequate protein intake  - Declines referral to weight management office for further exploration of weight loss medications and nutritional support  - Prescribed Zepbound with instructions for administration and proper disposal of used injectors  - Advised to contact the office after the third dose to discuss dosage increase  - Scheduled for touch base visits and labs every 3 to 4 months  Orders:  •  CBC and differential; Future  •  Comprehensive metabolic panel; Future  •  Hemoglobin A1C; Future  •  Lipid Panel with Direct LDL reflex; Future  •  tirzepatide (Zepbound) 2.5 mg/0.5 mL auto-injector; Inject 0.5 mL (2.5 mg total) under the skin once a week for 28 days    Mixed hyperlipidemia  Elevated cholesterol:  - Despite weight training and dietary modifications, cholesterol levels remain elevated  - Re-evaluation in 4 months to assess improvements with weight loss  - Consider cholesterol-lowering medication if no significant improvement  Orders:  •  Comprehensive metabolic panel; Future  •  Lipid Panel with Direct LDL reflex; Future    Prediabetes  Should improve with use of zepbound  Orders:  •  CBC and differential; Future  •  Hemoglobin A1C; Future    Encounter for screening mammogram for breast cancer  Mammogram ordered.  Orders:  •  Mammo screening bilateral w 3d and cad; Future             Most recent labs reviewed     Subjective:     Pau is a 54 y.o. female here today and has the below chronic conditions:    Patient Active Problem List   Diagnosis   • Mixed hyperlipidemia   • Prediabetes   • Class 3 severe obesity with serious comorbidity and body mass index (BMI) of 40.0 to 44.9 in adult (HCC)     Current Outpatient Medications   Medication Sig Dispense  Refill   • tirzepatide (Zepbound) 2.5 mg/0.5 mL auto-injector Inject 0.5 mL (2.5 mg total) under the skin once a week for 28 days 2 mL 0     No current facility-administered medications for this visit.          Chief Complaint   Patient presents with   • Medication Management     Wants to know options for weight management medications     HPI:  - CC above per clinical staff and reviewed.    History of Present Illness  The patient presents for weight loss.    Weight Management  - She has struggled with weight management throughout her life, maintaining her current weight with slight fluctuations.  - She assesses her weight by trying on her wedding dress annually, which has become slightly tighter.  - Despite daily physical activities, a strict diet, and weight training, she finds it challenging to lose weight.  - Her diet includes identical meals for breakfast and lunch, with an awareness of caloric intake.  - She notes that consuming 1100 calories daily does not result in weight loss, while a 1500-calorie diet leads to weight gain.  - She ceased alcohol consumption on 11/30/2024- not a heavy drinker prior to that but wanted to try to cut out any extra caloric intake.  - She finds it mentally exhausting to constantly monitor her diet and plan meals.  - She is interested in exploring weight loss medications.  - She has tried various weight loss programs, including Noom, Weight Watchers, and Medifast, with varying success.  - She has no history of diabetes or sleep apnea and has not recently used any weight loss medications.  - She notes that even a slight weight gain of 1 to 2 pounds affects her knees during workouts.      SOCIAL HISTORY  - Stopped drinking alcohol on 11/30/2024    No family hx of MEN-2 or medullary thyroid cancer.  No personal or family hx of pancreatitis.      The following portions of the patient's history were reviewed and updated as appropriate: allergies, current medications, past family  "history, past medical history, past social history, past surgical history and problem list.    ROS:  Review of Systems   As noted above.    Objective:      /78 (BP Location: Left arm, Patient Position: Sitting, Cuff Size: Adult)   Pulse 65   Temp 98.2 °F (36.8 °C) (Tympanic)   Ht 5' 0.5\" (1.537 m)   Wt 94.7 kg (208 lb 12.8 oz)   LMP 04/03/2019 (Exact Date)   SpO2 98%   BMI 40.11 kg/m²   BP Readings from Last 3 Encounters:   02/11/25 146/78   05/01/23 118/70   11/10/22 136/63     Wt Readings from Last 3 Encounters:   02/11/25 94.7 kg (208 lb 12.8 oz)   05/01/23 93.8 kg (206 lb 12.8 oz)   11/10/22 95.3 kg (210 lb)               Physical Exam:   Physical Exam  Vitals and nursing note reviewed.   Constitutional:       Appearance: Normal appearance. She is not ill-appearing.   HENT:      Mouth/Throat:      Mouth: Mucous membranes are moist.   Pulmonary:      Effort: No respiratory distress.   Neurological:      Mental Status: She is alert and oriented to person, place, and time.      Gait: Gait normal.   Psychiatric:         Mood and Affect: Mood normal.         Behavior: Behavior normal.            I have spent a total time of 60 minutes in caring for this patient on the day of the visit/encounter including Risks and benefits of tx options, Instructions for management, Risk factor reductions, Documenting in the medical record, Reviewing / ordering tests, medicine, procedures  , and Obtaining or reviewing history  .      This office visit note was generated in part with the use of [employer] and/or voice recognition dictation.   "

## 2025-02-12 ENCOUNTER — TELEPHONE (OUTPATIENT)
Dept: FAMILY MEDICINE CLINIC | Facility: CLINIC | Age: 55
End: 2025-02-12

## 2025-02-12 NOTE — TELEPHONE ENCOUNTER
Fax received from [employer] requesting prior authorization for Zepbound 2.5mg . Patient instructions are Inject 0.5 mL (2.5 mg total) under the skin once a week for 28 days     Key [id number]  Please initiate prior authorization.

## 2025-02-14 ENCOUNTER — TELEPHONE (OUTPATIENT)
Age: 55
End: 2025-02-14

## 2025-02-14 NOTE — TELEPHONE ENCOUNTER
PA for ZEPBOUND 2.5MG SUBMITTED to [employer]    via      [x][employer]-Case ID #     [x]PA sent as URGENT    All office notes, labs and other pertaining documents and studies sent. Clinical questions answered. Awaiting determination from insurance company.     Turnaround time for your insurance to make a decision on your Prior Authorization can take 7-21 business days.

## 2025-02-14 NOTE — TELEPHONE ENCOUNTER
PA for ZEPBOUND 2.5MG APPROVED     Date(s) approved         Patient advised by          [x]MyChart Message  []Phone call   []LMOM  []L/M to call office as no active Communication consent on file  []Unable to leave detailed message as VM not approved on Communication consent       Pharmacy advised by    [x]Fax  []Phone call

## 2025-03-04 ENCOUNTER — PATIENT MESSAGE (OUTPATIENT)
Dept: FAMILY MEDICINE CLINIC | Facility: CLINIC | Age: 55
End: 2025-03-04

## 2025-03-04 DIAGNOSIS — E66.01 CLASS 3 SEVERE OBESITY WITH SERIOUS COMORBIDITY AND BODY MASS INDEX (BMI) OF 40.0 TO 44.9 IN ADULT, UNSPECIFIED OBESITY TYPE (HCC): ICD-10-CM

## 2025-03-04 DIAGNOSIS — E66.813 CLASS 3 SEVERE OBESITY WITH SERIOUS COMORBIDITY AND BODY MASS INDEX (BMI) OF 40.0 TO 44.9 IN ADULT, UNSPECIFIED OBESITY TYPE (HCC): ICD-10-CM

## 2025-03-04 RX ORDER — TIRZEPATIDE 5 MG/.5ML
5 INJECTION, SOLUTION SUBCUTANEOUS WEEKLY
Qty: 2 ML | Refills: 0 | Status: SHIPPED | OUTPATIENT
Start: 2025-03-04

## 2025-03-04 RX ORDER — TIRZEPATIDE 2.5 MG/.5ML
2.5 INJECTION, SOLUTION SUBCUTANEOUS WEEKLY
Qty: 2 ML | Refills: 0 | Status: CANCELLED | OUTPATIENT
Start: 2025-03-04 | End: 2025-04-01

## 2025-03-04 NOTE — TELEPHONE ENCOUNTER
Refill of 5 mg dose zepbound sent, pt to notify me after 3 weeks on how she is feeling  Mychart message sent.

## 2025-04-03 ENCOUNTER — PATIENT MESSAGE (OUTPATIENT)
Dept: FAMILY MEDICINE CLINIC | Facility: CLINIC | Age: 55
End: 2025-04-03

## 2025-04-03 DIAGNOSIS — E66.813 CLASS 3 SEVERE OBESITY WITH SERIOUS COMORBIDITY AND BODY MASS INDEX (BMI) OF 40.0 TO 44.9 IN ADULT, UNSPECIFIED OBESITY TYPE (HCC): ICD-10-CM

## 2025-04-03 DIAGNOSIS — E66.01 CLASS 3 SEVERE OBESITY WITH SERIOUS COMORBIDITY AND BODY MASS INDEX (BMI) OF 40.0 TO 44.9 IN ADULT, UNSPECIFIED OBESITY TYPE (HCC): ICD-10-CM

## 2025-04-03 RX ORDER — TIRZEPATIDE 5 MG/.5ML
5 INJECTION, SOLUTION SUBCUTANEOUS WEEKLY
Qty: 2 ML | Refills: 1 | Status: SHIPPED | OUTPATIENT
Start: 2025-04-03

## 2025-05-01 ENCOUNTER — PATIENT MESSAGE (OUTPATIENT)
Dept: FAMILY MEDICINE CLINIC | Facility: CLINIC | Age: 55
End: 2025-05-01

## 2025-05-01 DIAGNOSIS — E66.813 CLASS 3 SEVERE OBESITY WITH SERIOUS COMORBIDITY AND BODY MASS INDEX (BMI) OF 40.0 TO 44.9 IN ADULT, UNSPECIFIED OBESITY TYPE: ICD-10-CM

## 2025-05-02 RX ORDER — TIRZEPATIDE 5 MG/.5ML
5 INJECTION, SOLUTION SUBCUTANEOUS WEEKLY
Qty: 2 ML | Refills: 1 | Status: SHIPPED | OUTPATIENT
Start: 2025-05-02